# Patient Record
Sex: FEMALE | Race: WHITE | NOT HISPANIC OR LATINO
[De-identification: names, ages, dates, MRNs, and addresses within clinical notes are randomized per-mention and may not be internally consistent; named-entity substitution may affect disease eponyms.]

---

## 2017-08-21 ENCOUNTER — APPOINTMENT (OUTPATIENT)
Dept: RADIOLOGY | Facility: CLINIC | Age: 42
End: 2017-08-21
Payer: COMMERCIAL

## 2017-08-21 ENCOUNTER — OUTPATIENT (OUTPATIENT)
Dept: OUTPATIENT SERVICES | Facility: HOSPITAL | Age: 42
LOS: 1 days | End: 2017-08-21

## 2017-08-21 PROBLEM — Z00.00 ENCOUNTER FOR PREVENTIVE HEALTH EXAMINATION: Status: ACTIVE | Noted: 2017-08-21

## 2017-08-21 PROCEDURE — 73630 X-RAY EXAM OF FOOT: CPT | Mod: 26,LT

## 2019-01-20 ENCOUNTER — EMERGENCY (EMERGENCY)
Facility: HOSPITAL | Age: 44
LOS: 1 days | Discharge: HOME | End: 2019-01-20
Admitting: EMERGENCY MEDICINE
Payer: COMMERCIAL

## 2019-01-20 VITALS
RESPIRATION RATE: 18 BRPM | SYSTOLIC BLOOD PRESSURE: 127 MMHG | OXYGEN SATURATION: 100 % | DIASTOLIC BLOOD PRESSURE: 91 MMHG | TEMPERATURE: 98 F | HEART RATE: 67 BPM
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
## 2019-01-20 DIAGNOSIS — S52.122A DISPLACED FRACTURE OF HEAD OF LEFT RADIUS, INITIAL ENCOUNTER FOR CLOSED FRACTURE: ICD-10-CM
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
## 2019-01-20 DIAGNOSIS — M79.602 PAIN IN LEFT ARM: ICD-10-CM

## 2019-01-20 PROCEDURE — 99284 EMERGENCY DEPT VISIT MOD MDM: CPT | Mod: 25

## 2019-01-20 PROCEDURE — 73080 X-RAY EXAM OF ELBOW: CPT | Mod: 26,LT

## 2019-01-20 PROCEDURE — 73060 X-RAY EXAM OF HUMERUS: CPT | Mod: 26,LT

## 2019-01-20 PROCEDURE — 24650 CLTX RDL HEAD/NCK FX WO MNPJ: CPT | Mod: 54

## 2019-01-20 PROCEDURE — 73110 X-RAY EXAM OF WRIST: CPT | Mod: 26,LT

## 2019-01-20 RX ORDER — IBUPROFEN 200 MG
1 TABLET ORAL
Qty: 28 | Refills: 0 | OUTPATIENT
Start: 2019-01-20 | End: 2019-01-26

## 2019-01-20 RX ORDER — OXYCODONE AND ACETAMINOPHEN 5; 325 MG/1; MG/1
1 TABLET ORAL ONCE
Qty: 0 | Refills: 0 | Status: DISCONTINUED | OUTPATIENT
Start: 2019-01-20 | End: 2019-01-20

## 2019-01-20 RX ORDER — IBUPROFEN 200 MG
600 TABLET ORAL ONCE
Qty: 0 | Refills: 0 | Status: COMPLETED | OUTPATIENT
Start: 2019-01-20 | End: 2019-01-20

## 2019-01-20 RX ADMIN — Medication 600 MILLIGRAM(S): at 23:31

## 2019-01-20 RX ADMIN — OXYCODONE AND ACETAMINOPHEN 1 TABLET(S): 5; 325 TABLET ORAL at 23:41

## 2019-01-20 NOTE — ED PROVIDER NOTE - CARE PROVIDER_API CALL
Sandro Nelson), Orthopaedic Surgery  [address]  [address] [place] [address]  Phone: [phone]  Fax: [phone]

## 2019-01-20 NOTE — ED PROVIDER NOTE - OBJECTIVE STATEMENT
42 yo female RHD c/o left arm pain s/p trip and fall at home about an hour ago. no head  trauma or LOC. no numbness, no weakness, no vomiting, no other injuries. Ambulating in ED.

## 2019-01-20 NOTE — ED PROVIDER NOTE - DIAGNOSTIC INTERPRETATION
Interpreted by LUCY STROUD humerus 2 views  No fracture, no dislocation (joint spaces grossly normal), no Foreign Body noted, soft tissue normal   L elbow x-ray 3 views  radial head fracture, no dislocation (joint spaces grossly normal), no Foreign Body noted, soft tissue swelling  L wrist xrays 3 views  No fracture, no dislocation (joint spaces grossly normal), no Foreign Body noted, soft tissue normal

## 2019-01-20 NOTE — ED ADULT TRIAGE NOTE - CHIEF COMPLAINT QUOTE
pt c/o L shoulder, elbow and wrist pain s/p mechanical fall at home. limit ROM noted to LUE due to pain. no obvious deformities noted.

## 2019-01-20 NOTE — ED PROVIDER NOTE - NSFOLLOWUPINSTRUCTIONS_ED_ALL_ED_FT
Take pain medications as prescribed  Wear sling.  Please  follow up with Orthopedics by next week    RETURN TO THE EMERGENCY DEPARTMENT FOR WORSENING PAIN, NUMBNESS, WEAKNESS, OR ANY CONCERNS.      A fracture is a break in one of your bones. This can occur from a variety of injuries, especially traumatic ones. Symptoms include pain, bruising, or swelling. Do not use the injured limb. If a fracture is in one of the bones below your waist, do not put weight on that limb unless instructed to do so by your healthcare provider.     SEEK IMMEDIATE MEDICAL CARE IF YOU HAVE ANY OF THE FOLLOWING SYMPTOMS: numbness, tingling, increasing pain, or weakness in any part of the injured limb.

## 2019-01-20 NOTE — ED PROVIDER NOTE - PHYSICAL EXAMINATION
CONSTITUTIONAL: Well-developed; well-nourished; in no acute distress.  	SKIN: Skin is warm and dry, no acute rash.  	HEAD: Normocephalic; atraumatic.  	EYES: PERRL, EOM intact; conjunctiva and sclera clear.  	ENT: No nasal discharge; airway clear.  no tonsillar swelling or exudates, uvula midline, airway patent  	NECK: Supple; non tender.  	CARD: S1, S2 normal; no murmurs, gallops, or rubs. Regular rate and rhythm.  	RESP: No wheezes, rales or rhonchi.  	ABD: soft; non-distended; non-tender  	EXT: LUE: normal inspection, +tenderness to proximal humerus, radial head, and radial aspect of wrist. limited ROM of elbow, unable to pronate/supinate due to pain. full ROM shoulder/wrist/hand/digits. good cap refill. good radial pulse 2+. no snuffbox tenderness. soft compartments.   	NEURO: Alert, oriented. Grossly unremarkable.  PSYCH: Cooperative, appropriate.

## 2019-01-20 NOTE — ED PROVIDER NOTE - MEDICAL DECISION MAKING DETAILS
s/p mechanical fall, closed radial head fracture, nvi, placed in sling, rx pain meds, f/u outpatient ortho

## 2019-01-20 NOTE — ED ADULT NURSE NOTE - NSIMPLEMENTINTERV_GEN_ALL_ED
Implemented All Universal Safety Interventions:  [place] to call system. Call bell, personal items and telephone within reach. Instruct patient to call for assistance. Room bathroom lighting operational. Non-slip footwear when patient is off stretcher. Physically safe environment: no spills, clutter or unnecessary equipment. Stretcher in lowest position, wheels locked, appropriate side rails in place.

## 2019-01-23 ENCOUNTER — APPOINTMENT (OUTPATIENT)
Dept: ORTHOPEDIC SURGERY | Facility: CLINIC | Age: 44
End: 2019-01-23
Payer: COMMERCIAL

## 2019-01-23 VITALS
DIASTOLIC BLOOD PRESSURE: 78 MMHG | OXYGEN SATURATION: 97 % | WEIGHT: 150 LBS | RESPIRATION RATE: 16 BRPM | SYSTOLIC BLOOD PRESSURE: 114 MMHG | HEIGHT: 70 IN | HEART RATE: 75 BPM | BODY MASS INDEX: 21.47 KG/M2

## 2019-01-23 DIAGNOSIS — S52.122A DISPLACED FRACTURE OF HEAD OF LEFT RADIUS, INITIAL ENCOUNTER FOR CLOSED FRACTURE: ICD-10-CM

## 2019-01-23 DIAGNOSIS — Z80.9 FAMILY HISTORY OF MALIGNANT NEOPLASM, UNSPECIFIED: ICD-10-CM

## 2019-01-23 PROCEDURE — 99204 OFFICE O/P NEW MOD 45 MIN: CPT

## 2020-07-06 ENCOUNTER — TRANSCRIPTION ENCOUNTER (OUTPATIENT)
Age: 45
End: 2020-07-06

## 2023-01-25 ENCOUNTER — APPOINTMENT (OUTPATIENT)
Dept: OPHTHALMOLOGY | Facility: CLINIC | Age: 48
End: 2023-01-25
Payer: COMMERCIAL

## 2023-01-25 ENCOUNTER — NON-APPOINTMENT (OUTPATIENT)
Age: 48
End: 2023-01-25

## 2023-01-25 PROCEDURE — 92136 OPHTHALMIC BIOMETRY: CPT

## 2023-01-25 PROCEDURE — 92250 FUNDUS PHOTOGRAPHY W/I&R: CPT

## 2023-01-25 PROCEDURE — 92025 CPTRIZED CORNEAL TOPOGRAPHY: CPT

## 2023-01-25 PROCEDURE — 92004 COMPRE OPH EXAM NEW PT 1/>: CPT
